# Patient Record
Sex: MALE | Race: WHITE
[De-identification: names, ages, dates, MRNs, and addresses within clinical notes are randomized per-mention and may not be internally consistent; named-entity substitution may affect disease eponyms.]

---

## 2019-04-11 ENCOUNTER — HOSPITAL ENCOUNTER (EMERGENCY)
Dept: HOSPITAL 80 - FED | Age: 19
Discharge: HOME | End: 2019-04-11
Payer: COMMERCIAL

## 2019-04-11 VITALS — SYSTOLIC BLOOD PRESSURE: 114 MMHG | DIASTOLIC BLOOD PRESSURE: 55 MMHG

## 2019-04-11 DIAGNOSIS — R51: Primary | ICD-10-CM

## 2019-04-11 NOTE — EDPHY
H & P


Stated Complaint: migraines


Time Seen by Provider: 04/11/19 13:50


HPI/ROS: 


HPI:  This is a 18-year-old male who presents with





Chief Complaint:  Migraine headache





Location:  Frontal, bilateral temporal


Quality:  Headache


Duration:  3 days


Signs and Symptoms: no fever, + nausea, no vomiting, + photophobia, no noise 

sensitivity, no neck stiffness, no ear pain, no tinnitus, no nasal congestion, 

no sinus pressure, no weakness, no radiation, no aura, no neck stiffness, no 

vision changes, no rash


Timing:  Rapid onset, constant


Severity:  Moderate


Context:  Patient reports that he is a student at [employer] presents with 3 day history of frontal and bilateral temporal headache 

that moves from the front and forehead to the bilateral sides in a nonspecific 

pattern.  Reports this feels like similar migraine headaches as is company by 

photophobia and nausea.  Denies vomiting, fever, upper respiratory symptoms.  

Patient reports that weather changes and heat causes headaches.  Multiple 

allergies, not taking any antihistamines.  Missed class today.  Eating and 

drinking over the last few days without any difficulty.  Denies thunderclap 

symptoms or worst headache of life.


Modifying Factors:  No over-the-counter medications taken





Comment: 








ROS:   A comprehensive 10 system review of systems is otherwise negative aside 

from elements mentioned in the history of present illness. 





MEDICAL/SURGICAL/SOCIAL HISTORY: 


Medical history:  Attention deficit hyperactivity disorder, migraine headaches 

diagnosed at age 8


Surgical history:  Denies


Social history:  Originally from [place].  Nonsmoker.  Family history 

noncontributory.








CONSTITUTIONAL:  Anxious, extremely talkative, young adult white male, awake 

and alert, no obvious distress


HEENT: Atraumatic and normocephalic, PERRL, EOMI.  Nares patent; no rhinorrhea;

  no nasal mucosal edema. Tympanic membranes clear. Oropharynx clear, no 

exudate and moist pink mucosa.  Airway patent.  No lymphadenopathy.  No 

meningismus.


Cardiovascular: Normal S1/S2, regular rate, regular rhythm, without murmur rub 

or gallop.


PULMONARY/CHEST:  Symmetrical and nontender. Clear to auscultation bilaterally. 

Good air movement. No accessory muscle usage.


ABDOMEN:  Soft, nondistended, nontender, no rebound, no guarding, no peritoneal 

signs, no masses or organomegaly. No CVAT.


EXTREMITIES:  2/2 pulses, strength 5/5, no deformities, no clubbing, no 

cyanosis or edema.


NEUROLOGICAL: no focal neuro deficits.  GCS 15. Cranial nerves 2-12 grossly 

intact.  Ambulatory without deficits.  Speech normal.


SKIN: Warm and dry, no erythema. no rash.  Good capillary refill. 


 





Source: Patient


Exam Limitations: No limitations





- Personal History


Current Tetanus/Diphtheria Vaccine: Unsure


Current Tetanus Diphtheria and Acellular Pertussis (TDAP): Unsure





- Medical/Surgical History


Hx Asthma: No


Hx Chronic Respiratory Disease: No


Hx Diabetes: No


Hx Cardiac Disease: No


Hx Renal Disease: No


Hx Cirrhosis: No


Hx Alcoholism: No


Hx HIV/AIDS: No


Hx Splenectomy or Spleen Trauma: No





- Social History


Smoking Status: Never smoked


Constitutional: 


 Initial Vital Signs











Temperature (C)  36.4 C   04/11/19 13:37


 


Heart Rate  90   04/11/19 13:37


 


Respiratory Rate  20   04/11/19 13:37


 


Blood Pressure  133/78 H  04/11/19 13:37


 


O2 Sat (%)  97   04/11/19 13:37








 











O2 Delivery Mode               Room Air














Allergies/Adverse Reactions: 


 





aloe vera Allergy (Verified 04/11/19 13:37)


 


cat dander Allergy (Verified 04/11/19 13:37)


 


cocoa [chocolate] Allergy (Verified 04/11/19 13:37)


 








Home Medications: 














 Medication  Instructions  Recorded


 


Adderall 10 mg Tablet  04/11/19














Medical Decision Making


ED Course/Re-evaluation: 


Vital signs reviewed and stable upon arrival.  No systemic signs.


No neurological deficits. 


IV access obtained and given 1 L normal saline, IV Ativan 1 mg, IV Reglan 10 mg

, IV Benadryl 25 mg, IV Toradol 15 mg


1503:  Reassessed patient who reports moderate relief of symptoms and asking to 

be discharged home.


School excuse provided per request.


Referral to student health clinic as needed.








This patient was seen under the supervision of my primary supervising 

physician.  I evaluated care for this patient independently. 





Differential Diagnosis: 


Headache including but not limited to subarachnoid hemorrhage, migraine headache

, tension headache and infectious causes such as meningitis, pharyngitis and 

sinusitis.








- Data Points


Medications Given: 


 








Discontinued Medications





Diphenhydramine HCl (Benadryl Injection)  25 mg IVP EDNOW ONE


   Stop: 04/11/19 14:03


   Last Admin: 04/11/19 14:46 Dose:  25 mg


Sodium Chloride (Ns)  1,000 mls @ 3,000 mls/hr IV EDNOW ONE


   Stop: 04/11/19 14:21


   Last Admin: 04/11/19 14:38 Dose:  1,000 mls


Ketorolac Tromethamine (Toradol)  15 mg IVP/IM EDNOW ONE


   Stop: 04/11/19 14:03


   Last Admin: 04/11/19 14:39 Dose:  15 mg


Lorazepam (Ativan Injection)  1 mg IVP EDNOW ONE


   Stop: 04/11/19 14:04


   Last Admin: 04/11/19 14:42 Dose:  1 mg


Metoclopramide HCl (Reglan Injection)  10 mg IVP EDNOW ONE


   Stop: 04/11/19 14:03


   Last Admin: 04/11/19 14:49 Dose:  10 mg








Departure





- Departure


Disposition: Home, Routine, Self-Care


Clinical Impression: 


Headache


Qualifiers:


 Headache type: unspecified Headache chronicity pattern: acute headache 

Intractability: not intractable Qualified Code(s): R51 - Headache





Condition: Good


Instructions:  Migraine Headache (ED), Acute Headache (ED)


Additional Instructions: 


Rest as much as possible until you are feeling better.


Consume a minimum of 8-10 glasses of water or electrolyte fluid replacement 

drinks that include Gatorade, Powerade, Pedialyte. 


Take Tylenol 650 mg every 4 hours and/or Ibuprofen 600 mg every 8 hours with 

food as needed for pain/headache. 


Follow up with student health clinic if symptoms do not improve.








Return to the ER immediately if you have progressive headaches, neurologic 

deficits, gait abnormality, visual disturbance, slurred speech, or any other 

symptom that concerns you. 








Referrals: 


[facility] [Clinic] - As per Instructions


Stand Alone Forms:  School Excuse